# Patient Record
Sex: FEMALE | Race: WHITE | NOT HISPANIC OR LATINO | Employment: FULL TIME | URBAN - METROPOLITAN AREA
[De-identification: names, ages, dates, MRNs, and addresses within clinical notes are randomized per-mention and may not be internally consistent; named-entity substitution may affect disease eponyms.]

---

## 2024-05-13 ENCOUNTER — OFFICE VISIT (OUTPATIENT)
Dept: OBSTETRICS AND GYNECOLOGY | Facility: CLINIC | Age: 29
End: 2024-05-13
Payer: COMMERCIAL

## 2024-05-13 VITALS
HEART RATE: 81 BPM | BODY MASS INDEX: 36.48 KG/M2 | DIASTOLIC BLOOD PRESSURE: 80 MMHG | SYSTOLIC BLOOD PRESSURE: 123 MMHG | WEIGHT: 186.81 LBS

## 2024-05-13 DIAGNOSIS — Z31.89 ENCOUNTER FOR FERTILITY PLANNING: ICD-10-CM

## 2024-05-13 DIAGNOSIS — Z11.3 ENCOUNTER FOR SCREENING FOR INFECTIONS WITH PREDOMINANTLY SEXUAL MODE OF TRANSMISSION: ICD-10-CM

## 2024-05-13 DIAGNOSIS — Z01.419 WELL WOMAN EXAM WITH ROUTINE GYNECOLOGICAL EXAM: Primary | ICD-10-CM

## 2024-05-13 DIAGNOSIS — Z01.89 ENCOUNTER FOR LABORATORY TEST: ICD-10-CM

## 2024-05-13 DIAGNOSIS — N94.6 DYSMENORRHEA: ICD-10-CM

## 2024-05-13 PROCEDURE — 3074F SYST BP LT 130 MM HG: CPT | Mod: CPTII,S$GLB,,

## 2024-05-13 PROCEDURE — 3079F DIAST BP 80-89 MM HG: CPT | Mod: CPTII,S$GLB,,

## 2024-05-13 PROCEDURE — 3008F BODY MASS INDEX DOCD: CPT | Mod: CPTII,S$GLB,,

## 2024-05-13 PROCEDURE — 1160F RVW MEDS BY RX/DR IN RCRD: CPT | Mod: CPTII,S$GLB,,

## 2024-05-13 PROCEDURE — 99459 PELVIC EXAMINATION: CPT | Mod: S$GLB,,,

## 2024-05-13 PROCEDURE — 1159F MED LIST DOCD IN RCRD: CPT | Mod: CPTII,S$GLB,,

## 2024-05-13 PROCEDURE — 99395 PREV VISIT EST AGE 18-39: CPT | Mod: S$GLB,,,

## 2024-05-13 NOTE — PROGRESS NOTES
Subjective:      Patient ID: Ronel Douglas is a 28 y.o. female who presents for evaluation today.    Chief Complaint:    Well Woman, Dysmenorrhea (Pt states that her periods are extremely painful. She feels like she is being stabbed in the stomach. She thinks she may have endometriosis. Her mom had it.), and Insulin Resistance (She would like her hormones checked and her insulin. She has been having symptoms lately that is making her think she may be insulin resistant.)      History of Present Illness  HPI  Annual Exam (Premenopausal) - Patient presents for annual exam. The patient also has complaints of painful periods. The patient is sexually active. GYN screening history: last pap: was normal. The patient wears seatbelts: yes. The patient participates in regular exercise: no. Has the patient ever been transfused or tattooed?: not asked. The patient reports that there is not domestic violence in her life. She reports regular periods, they are painful and crampy. They last approximately 5 days, she uses menstrual cup, changing every 2-3 hours on a heavy day. She takes mydol or aleve at home for discomfort. She denies non-menstrual pelvic pain, nor dyspareunia. She has been tracking her cycle and actively trying for 2 years with her . She reports soft, normal bowel movements. No problems with urination. She reports feeling bloated after eating, weight gain of approximately 25lb in 4 years. No particular exercise or dietary changes.    GYN History  Patient's last menstrual period was 05/03/2024 (exact date).   Date of Last Pap: N/A    VITALS  /80   Pulse 81   Wt 84.7 kg (186 lb 12.8 oz)   LMP 05/03/2024 (Exact Date)   BMI 36.48 kg/m²   Weight: 84.7 kg (186 lb 12.8 oz)         PAST MEDICAL HISTORY  Past Medical History:   Diagnosis Date    ADHD        PAST SURGICAL HISTORY  History reviewed. No pertinent surgical history.    SOCIAL HISTORY  Social History     Tobacco Use   Smoking Status Never    Smokeless Tobacco Current   ,   Social History     Substance and Sexual Activity   Alcohol Use Not Currently        MEDICATIONS  No outpatient medications have been marked as taking for the 5/13/24 encounter (Office Visit) with Micheline Gomez NP.         Review of Systems   Review of Systems   Constitutional:  Negative for activity change.   Eyes:  Negative for visual disturbance.   Respiratory:  Negative for shortness of breath.    Cardiovascular:  Negative for chest pain.   Gastrointestinal:  Negative for abdominal pain.   Genitourinary:  Positive for dysmenorrhea. Negative for vaginal bleeding.        No abnormal vaginal bleeding   Musculoskeletal:  Negative for back pain.   Integumentary:  Negative for rash and breast mass.   Neurological:  Negative for numbness.   Psychiatric/Behavioral:          No mood disturbance or changes    Breast: Negative for mass          Objective:     Physical Exam:   Constitutional: She is oriented to person, place, and time. She appears well-developed. She is cooperative.    HENT:   Head: Normocephalic.     Neck: Trachea normal. No thyromegaly present.    Cardiovascular:  Normal rate, regular rhythm and normal heart sounds.             Pulmonary/Chest: Effort normal and breath sounds normal. Right breast exhibits no mass, no nipple discharge and no skin change. Left breast exhibits no mass, no nipple discharge and no skin change.        Abdominal: Soft. There is no abdominal tenderness. There is no rebound and no guarding.     Genitourinary:    Vagina and uterus normal.      Pelvic exam was performed with patient supine.   The external female genitalia was normal.     Labial bartholins normal.There is no lesion on the right labia. There is no lesion on the left labia. Cervix is normal. Right adnexum displays no mass and no tenderness. Left adnexum displays no mass and no tenderness. Cervix exhibits no motion tenderness and no discharge. Uterus is not enlarged and not tender.               Lymphadenopathy:        Head (right side): No submental and no submandibular adenopathy present.        Head (left side): No submental and no submandibular adenopathy present.     She has no cervical adenopathy.    Neurological: She is alert and oriented to person, place, and time.    Skin: Skin is warm.    Psychiatric: She has a normal mood and affect. Her speech is normal and behavior is normal. Thought content normal.          Assessment:        1. Well woman exam with routine gynecological exam    2. Encounter for laboratory test    3. Encounter for fertility planning    4. Dysmenorrhea    5. Encounter for screening for infections with predominantly sexual mode of transmission       Well woman exam with routine gynecological exam    Encounter for laboratory test  -     Vitamin D; Future; Expected date: 05/13/2024  -     CBC Auto Differential; Future; Expected date: 05/13/2024  -     Comprehensive Metabolic Panel; Future; Expected date: 05/13/2024  -     T4, Free; Future; Expected date: 05/13/2024  -     TSH; Future; Expected date: 05/13/2024  -     Urinalysis, Reflex to Urine Culture Urine, Clean Catch; Future; Expected date: 05/13/2024  -     Lipid Panel; Future; Expected date: 05/13/2024  -     Hemoglobin A1C; Future; Expected date: 05/13/2024    Encounter for fertility planning  -     Prolactin; Future; Expected date: 05/13/2024  -     Insulin, Random; Future; Expected date: 05/13/2024  -     Testosterone; Future; Expected date: 05/13/2024  -     Testosterone, Free; Future; Expected date: 05/13/2024  -     Estradiol; Future; Expected date: 05/13/2024  -     Follicle Stimulating Hormone; Future; Expected date: 05/13/2024  -     DHEA-Sulfate; Future; Expected date: 05/13/2024  -     Luteinizing Hormone; Future; Expected date: 05/13/2024  -     Antimullerian hormone (AMH); Future; Expected date: 05/13/2024  -     HCG, Serum, Qualitative; Future; Expected date: 05/13/2024  -     Ambulatory  referral/consult to Infertility; Future; Expected date: 05/20/2024    Dysmenorrhea  -     US OB/GYN Procedure ([facility]); Future    Encounter for screening for infections with predominantly sexual mode of transmission  -     C. trachomatis/N. gonorrhoeae by AMP DNA Other; Cervicovaginal         Plan:     Patient instructed to contact the clinic should any questions or concerns arise prior to her next office visit. Patient is happy with the plan of care at this time, verbalizes understanding and denies outstanding questions.      Pap  Self-breast exams  Daily prenatal vitamin with folic acid  If you don't hear from the office regarding results within 1 week, please call  Follow up in 1 year for annual or sooner as needed  Chaperone present for exam

## 2024-05-14 LAB
CHLAMYDIA: NEGATIVE
GONORRHEA: NEGATIVE
SOURCE: NORMAL

## 2024-06-13 ENCOUNTER — PROCEDURE VISIT (OUTPATIENT)
Dept: OBSTETRICS AND GYNECOLOGY | Facility: CLINIC | Age: 29
End: 2024-06-13
Payer: COMMERCIAL

## 2024-06-13 DIAGNOSIS — R79.89 ELEVATED PROLACTIN LEVEL: ICD-10-CM

## 2024-06-13 DIAGNOSIS — N94.6 DYSMENORRHEA: ICD-10-CM

## 2024-06-13 DIAGNOSIS — E88.819 INSULIN RESISTANCE: Primary | ICD-10-CM

## 2024-06-13 DIAGNOSIS — Z31.89 ENCOUNTER FOR FERTILITY PLANNING: ICD-10-CM

## 2024-06-13 LAB
ABS NRBC COUNT: 0 X 10 3/UL (ref 0–0.01)
ABSOLUTE BASOPHIL: 0.02 X 10 3/UL (ref 0–0.22)
ABSOLUTE EOSINOPHIL: 0.05 X 10 3/UL (ref 0.04–0.54)
ABSOLUTE IMMATURE GRAN: 0.03 X 10 3/UL (ref 0–0.04)
ABSOLUTE LYMPHOCYTE: 1.97 X 10 3/UL (ref 0.86–4.75)
ABSOLUTE MONOCYTE: 0.39 X 10 3/UL (ref 0.22–1.08)
ADDITIONAL TESTING: NORMAL
ALBUMIN SERPL-MCNC: 4.5 G/DL (ref 3.5–5.2)
ALBUMIN/GLOB SERPL ELPH: 1.5 {RATIO} (ref 1–2.7)
ALP ISOS SERPL LEV INH-CCNC: 83 U/L (ref 35–105)
ALT (SGPT): 14 U/L (ref 0–33)
AMORPH URATE CRY URNS QL MICRO: NEGATIVE
ANION GAP SERPL CALC-SCNC: 13 MMOL/L (ref 8–17)
AST SERPL-CCNC: 14 U/L (ref 0–32)
BACTERIA #/AREA URNS HPF: ABNORMAL /[HPF]
BASOPHILS NFR BLD: 0.3 % (ref 0.2–1.2)
BILIRUB UR QL STRIP: NEGATIVE
BILIRUBIN, TOTAL: 0.38 MG/DL (ref 0–1.2)
BUN/CREAT SERPL: 13.2 (ref 6–20)
CALCIUM SERPL-MCNC: 9.8 MG/DL (ref 8.6–10.2)
CARBON DIOXIDE, CO2: 26 MMOL/L (ref 22–29)
CHLORIDE: 104 MMOL/L (ref 98–107)
CHOLEST SERPL-MSCNC: 170 MG/DL (ref 100–200)
CLARITY UR: ABNORMAL
COLOR UR: YELLOW
CREAT SERPL-MCNC: 0.74 MG/DL (ref 0.5–0.9)
DHEA SULFATE: 149 UG/DL (ref 98.8–340)
E2: 439 PG/ML
EOSINOPHIL NFR BLD: 0.7 % (ref 0.7–7)
EPITHELIAL CELLS: ABNORMAL
ESTIMATED AVERAGE GLUCOSE: 99 MG/DL
FREE TESTOSTERONE: 0.52 NG/DL (ref 0–1)
FSH: 15 MIU/ML
GFR ESTIMATION: 114.36 ML/MIN/1.73M2
GLOBULIN: 3.1 G/DL (ref 1.5–4.5)
GLUCOSE (UA): NEGATIVE MG/DL
GLUCOSE: 98 MG/DL (ref 74–106)
HBA1C MFR BLD: 5.1 % (ref 4–6)
HCG QUALITATIVE: NEGATIVE MIU/ML
HCT VFR BLD AUTO: 42.5 % (ref 37–47)
HDLC SERPL-MCNC: 72 MG/DL
HGB BLD-MCNC: 13.6 G/DL (ref 12–16)
IMMATURE GRANULOCYTES: 0.4 % (ref 0–0.5)
INSULIN AB SER QL: 20.4 UIU/ML (ref 2.6–24.9)
KETONES UR QL STRIP: ABNORMAL MG/DL
LDL/HDL RATIO: 1.2 (ref 1–3)
LDLC SERPL CALC-MCNC: 85 MG/DL (ref 0–100)
LEUKOCYTE ESTERASE UR QL STRIP: ABNORMAL
LH: 50.1 MIU/ML
LYMPHOCYTES NFR BLD: 27 % (ref 19.3–53.1)
MCH RBC QN AUTO: 27.6 PG (ref 27–32)
MCHC RBC AUTO-ENTMCNC: 32 G/DL (ref 32–36)
MCV RBC AUTO: 86.2 FL (ref 82–100)
MONOCYTES NFR BLD: 5.3 % (ref 4.7–12.5)
MUCOUS THREADS URNS QL MICRO: NEGATIVE
NEUTROPHILS # BLD AUTO: 4.84 X 10 3/UL (ref 2.15–7.56)
NEUTROPHILS NFR BLD: 66.3 % (ref 34–71.1)
NITRITE UR QL STRIP: NEGATIVE
NUCLEATED RED BLOOD CELLS: 0 /100 WBC (ref 0–0.2)
OCCULT BLOOD: NEGATIVE
PH, URINE: 5 (ref 5–7.5)
PLATELET # BLD AUTO: 317 X 10 3/UL (ref 135–400)
POTASSIUM: 4.1 MMOL/L (ref 3.5–5.1)
PROLACTIN SERPL-MCNC: 37.3 NG/ML (ref 4.79–23.3)
PROT SNV-MCNC: 7.6 G/DL (ref 6.4–8.3)
PROT UR QL STRIP: NEGATIVE MG/DL
RBC # BLD AUTO: 4.93 X 10 6/UL (ref 4.2–5.4)
RBC/HPF: NEGATIVE
RDW-SD: 39.6 FL (ref 37–54)
SODIUM: 143 MMOL/L (ref 136–145)
SP GR UR STRIP: 1.02 (ref 1–1.03)
T4, FREE: 1.21 NG/DL (ref 0.93–1.7)
TESTOST SERPL-MCNC: 28.8 NG/DL (ref 8.4–48.1)
TRIGL SERPL-MCNC: 65 MG/DL (ref 0–150)
TSH SERPL DL<=0.005 MIU/L-ACNC: 1.41 UIU/ML (ref 0.27–4.2)
UREA NITROGEN (BUN): 9.8 MG/DL (ref 6–20)
UROBILINOGEN, URINE: NORMAL E.U./DL (ref 0–1)
VITAMIN D (25OHD): 31.1 NG/ML
WBC # BLD: 7.3 X 10 3/UL (ref 4.3–10.8)
WBC/HPF: ABNORMAL

## 2024-06-13 PROCEDURE — 76856 US EXAM PELVIC COMPLETE: CPT | Mod: S$GLB,,, | Performed by: OBSTETRICS & GYNECOLOGY

## 2024-06-13 RX ORDER — METFORMIN HYDROCHLORIDE 750 MG/1
1500 TABLET, EXTENDED RELEASE ORAL
Qty: 60 TABLET | Refills: 11 | Status: SHIPPED | OUTPATIENT
Start: 2024-06-13 | End: 2025-06-13

## 2024-09-26 ENCOUNTER — TELEPHONE (OUTPATIENT)
Dept: OBSTETRICS AND GYNECOLOGY | Facility: CLINIC | Age: 29
End: 2024-09-26
Payer: COMMERCIAL

## 2024-09-26 NOTE — TELEPHONE ENCOUNTER
----- Message from Fiona Ledbetter sent at 9/26/2024  2:53 PM CDT -----  Regarding: soon appt  Contact: Ronel  .Type:  Sooner Apoointment Request    Caller is requesting a sooner appointment.  Caller declined first available appointment listed below.  Caller will not accept being placed on the waitlist and is requesting a message be sent to doctor.  Name of Caller: Ronel   When is the first available appointment?  Symptoms:  Would the patient rather a call back or a response via [facility]? call  Best Call Back Number: [phone] (home)    Additional Information: Ronel is requesting a callback from the nurse today in regard to being scheduled for her first pregnancy confirmation appointment please.

## 2024-09-26 NOTE — TELEPHONE ENCOUNTER
Spoke with patient, verified that she was on her husbands insurance and has [employer] and patient confirmed. [facility]  and she states that she only saw her because  moved. Patients lmp was 8/23/24. Scheduled patient for a pregnancy confirmation next week.

## 2024-10-01 ENCOUNTER — TELEPHONE (OUTPATIENT)
Dept: OBSTETRICS AND GYNECOLOGY | Facility: CLINIC | Age: 29
End: 2024-10-01

## 2024-10-01 DIAGNOSIS — O20.9 BLEEDING IN EARLY PREGNANCY: Primary | ICD-10-CM

## 2024-10-01 NOTE — TELEPHONE ENCOUNTER
----- Message from Cortney sent at 10/1/2024  8:03 AM CDT -----  Contact: Charlotte (Mother)  Patient is requesting a call back regarding bleeding while pregnant. Please call back at [phone]

## 2024-10-01 NOTE — TELEPHONE ENCOUNTER
Reviewed the patient's emergency room notes and spoke with the patient's she reports her bleeding is very minimal at this point in his only brown when she wipes.  She has no pain at all and has no cramping.  The patient we will repeat an hCG level on Thursday.  I have told her we will put a notes that we will check this level and not leave without seeing with this as going to be.  I have also given the precautions for which the patient is to call our office or to return to the emergency room if his after hours.  The patient voiced understanding.

## 2024-10-01 NOTE — TELEPHONE ENCOUNTER
Called pt mother she gave phone to patient she stated she had some cramping this morning and went to take a bath when she got out the bath tub she had a big gush of blood. I advised pt to report to [facility] er to be evaluated

## 2024-10-03 ENCOUNTER — TELEPHONE (OUTPATIENT)
Dept: OBSTETRICS AND GYNECOLOGY | Facility: CLINIC | Age: 29
End: 2024-10-03
Payer: COMMERCIAL

## 2024-10-03 DIAGNOSIS — O20.9 BLEEDING IN EARLY PREGNANCY: Primary | ICD-10-CM

## 2024-10-03 LAB — B-HCG SERPL-ACNC: 944 MIU/ML

## 2024-10-03 NOTE — TELEPHONE ENCOUNTER
Spoke with the patient regarding her dropping hCG level.  The patient reports she has spotting but not bleeding heavily.  Precautions were given regarding need for going to the emergency room also we discussed the different options of D&C versus medical management versus expectant management.  We will do an ultrasound in the morning the patient knows to be NPO after midnight in case we need to move towards a D&C.

## 2024-10-04 ENCOUNTER — OFFICE VISIT (OUTPATIENT)
Dept: OBSTETRICS AND GYNECOLOGY | Facility: CLINIC | Age: 29
End: 2024-10-04
Payer: COMMERCIAL

## 2024-10-04 ENCOUNTER — PROCEDURE VISIT (OUTPATIENT)
Dept: OBSTETRICS AND GYNECOLOGY | Facility: CLINIC | Age: 29
End: 2024-10-04
Payer: COMMERCIAL

## 2024-10-04 VITALS
DIASTOLIC BLOOD PRESSURE: 86 MMHG | SYSTOLIC BLOOD PRESSURE: 134 MMHG | BODY MASS INDEX: 37.15 KG/M2 | HEART RATE: 88 BPM | HEIGHT: 60 IN | WEIGHT: 189.19 LBS

## 2024-10-04 DIAGNOSIS — O20.9 BLEEDING IN EARLY PREGNANCY: ICD-10-CM

## 2024-10-04 DIAGNOSIS — O03.9 MISCARRIAGE: Primary | ICD-10-CM

## 2024-10-04 PROCEDURE — 76817 TRANSVAGINAL US OBSTETRIC: CPT | Mod: 26,S$PBB,, | Performed by: OBSTETRICS & GYNECOLOGY

## 2024-10-04 NOTE — PROGRESS NOTES
Subjective     Patient ID: Ronel Douglas is a 28 y.o. female.    Chief Complaint:  Threatened Miscarriage      History of Present Illness  HPI  Patient presents today with complaints of vaginal bleeding was here to follow up from the emergency room.  All those notes were reviewed as well as her labs.  The patient had a positive pregnancy test she was then seen in the emergency room for vaginal bleeding.  Those notes revealed that she had an ultrasound showing a small sac in the uterus with 2 2 cm cyst in the left adnexa.  The patient had an hCG of 1300s and was noted to have O-positive blood type.  I spoke with her that day and she reported her pain had resolved and she just had some menstrual Like bleeding.  At that time precautions were given and she was instructed to follow up with a repeat hCG which dropped down to 940.  She reports she has just had bleeding but no pain at this time.    GYN & OB History  Patient's last menstrual period was 2024 (exact date).   Date of Last Pap: 5/10/2023    OB History    Para Term  AB Living   1 0 0 0 1 0   SAB IAB Ectopic Multiple Live Births   1 0 0 0 0      # Outcome Date GA Lbr Aureliano/2nd Weight Sex Type Anes PTL Lv   1 SAB                Review of Systems  Review of Systems   Gastrointestinal:  Negative for abdominal pain, bloating, blood in stool, constipation, diarrhea, nausea, vomiting, reflux and fecal incontinence.   Genitourinary:  Positive for vaginal bleeding. Negative for bladder incontinence, decreased libido, dysmenorrhea, dyspareunia, dysuria, flank pain, frequency, genital sores, hematuria, hot flashes, menorrhagia, menstrual problem, pelvic pain, urgency, vaginal discharge, vaginal pain, urinary incontinence, postcoital bleeding, postmenopausal bleeding, vaginal dryness and vaginal odor.          Objective   Physical Exam:   Constitutional: Vital signs are normal. She appears well-developed and well-nourished.               Genitourinary:     Vagina and uterus normal.      Pelvic exam was performed with patient supine.     Labial bartholins normal.Cervix is normal. Right adnexum displays no mass, no tenderness and no fullness. Left adnexum displays no mass, no tenderness and no fullness. No erythema, vaginal discharge, tenderness, bleeding, rectocele, cystocele or prolapse of vaginal walls in the vagina.    No foreign body in the vagina.      No signs of injury in the vagina.   Cervix exhibits no motion tenderness, no discharge and no friability.                     Indication   ========   Indication: Missed      Pregnancy   =========   Quintana pregnancy. Number of fetuses: 1     Assessment   ==========   Gestational sac: not visualized     Maternal Structures   ===============   Uterus / Cervix   Uterus length 95 mm   Uterus width 67 mm   Uterus height 49 mm   Uterus Vol 163.0 cmï¿½   Endometrial thickness, total 11.0 mm   Ovaries / Tubes / Adnexa   Rt ovary D1 29 mm   Rt ovary D2 25 mm   Rt ovary D3 14 mm   Rt ovary Vol 5.3 cmï¿½   Lt ovary D1 38 mm   Lt ovary D2 47 mm   Lt ovary D3 31 mm   Lt ovary Vol 28.6 cmï¿½     Impression   =========   No Gs vis. 2.3 cm complex area adjacent to vs on lt ovary   2.3 cystic area vis lt ovary,   2 cm area (corpus luteum)   Cystic area vis rt ovary 1.5 cm      Assessment and Plan     1. Miscarriage            Plan:  Miscarriage  -     HCG, Quantitative; Future; Expected date: 10/04/2024      The patient now has nothing in her uterus she also has no pain at this time.  And her HCg has been dropping.  We had an extensive discussion about expectant management at this time.  I went over with her and her spouse the ultrasound findings.  We discussed the findings in the adnexa and discuss that she has 0 pain and that we could do expectant management and repeat hCG and discuss that if she had any pain she will need to come in.  We also discussed that we could do something like methotrexate.  The patient  declined that.  We will repeat an hCG on Monday.  The patient understands that if she has to have any pain she needs to come in and she voiced understanding.

## 2024-10-08 ENCOUNTER — PROCEDURE VISIT (OUTPATIENT)
Dept: OBSTETRICS AND GYNECOLOGY | Facility: CLINIC | Age: 29
End: 2024-10-08
Payer: COMMERCIAL

## 2024-10-08 ENCOUNTER — OFFICE VISIT (OUTPATIENT)
Dept: OBSTETRICS AND GYNECOLOGY | Facility: CLINIC | Age: 29
End: 2024-10-08
Payer: COMMERCIAL

## 2024-10-08 ENCOUNTER — TELEPHONE (OUTPATIENT)
Dept: OBSTETRICS AND GYNECOLOGY | Facility: CLINIC | Age: 29
End: 2024-10-08

## 2024-10-08 VITALS
DIASTOLIC BLOOD PRESSURE: 82 MMHG | WEIGHT: 190.19 LBS | HEART RATE: 70 BPM | BODY MASS INDEX: 37.34 KG/M2 | SYSTOLIC BLOOD PRESSURE: 121 MMHG | HEIGHT: 60 IN

## 2024-10-08 DIAGNOSIS — O20.0 THREATENED MISCARRIAGE: ICD-10-CM

## 2024-10-08 DIAGNOSIS — O00.102 LEFT TUBAL PREGNANCY, UNSPECIFIED WHETHER INTRAUTERINE PREGNANCY PRESENT: Primary | ICD-10-CM

## 2024-10-08 DIAGNOSIS — O03.9 MISCARRIAGE: Primary | ICD-10-CM

## 2024-10-08 PROCEDURE — 3079F DIAST BP 80-89 MM HG: CPT | Mod: CPTII,,, | Performed by: OBSTETRICS & GYNECOLOGY

## 2024-10-08 PROCEDURE — 3044F HG A1C LEVEL LT 7.0%: CPT | Mod: CPTII,,, | Performed by: OBSTETRICS & GYNECOLOGY

## 2024-10-08 PROCEDURE — 1159F MED LIST DOCD IN RCRD: CPT | Mod: CPTII,,, | Performed by: OBSTETRICS & GYNECOLOGY

## 2024-10-08 PROCEDURE — 76817 TRANSVAGINAL US OBSTETRIC: CPT | Mod: 26,S$PBB,, | Performed by: OBSTETRICS & GYNECOLOGY

## 2024-10-08 PROCEDURE — 3008F BODY MASS INDEX DOCD: CPT | Mod: CPTII,,, | Performed by: OBSTETRICS & GYNECOLOGY

## 2024-10-08 PROCEDURE — 99213 OFFICE O/P EST LOW 20 MIN: CPT | Mod: S$PBB,,, | Performed by: OBSTETRICS & GYNECOLOGY

## 2024-10-08 PROCEDURE — 3074F SYST BP LT 130 MM HG: CPT | Mod: CPTII,,, | Performed by: OBSTETRICS & GYNECOLOGY

## 2024-10-08 NOTE — PROGRESS NOTES
Subjective     Patient ID: Ronel Douglas is a 29 y.o. female.    Chief Complaint:  Miscarriage (Hcg level not going down)      History of Present Illness  HPI  Patient presents today with complaints of follow up on beta HCG level.  The patient has an ultrasound done showing similar findings from last week.  She was seen in the emergency room for bleeding with a positive pregnancy test had an hCG level in the 1300s and a sac like structure with some cysts in the left adnexa.  Follow up HCG was in the 900s she was seen with me with some cysts in the adnexa and a structure also in the adnexa but had no pain and had continued to have some light bleeding.  After discussion it was decided for the patient to follow up with a repeat hCG wishes only in the 800s at this time  The patient reports she still continues to have some bleeding and denies any significant pain  History reviewed. No pertinent surgical history.   Past Medical History:  No date: [facility]   GYN & OB History  Patient's last menstrual period was 2024 (exact date).   Date of Last Pap: 5/10/2023    OB History    Para Term  AB Living   1 0 0 0 1 0   SAB IAB Ectopic Multiple Live Births   1 0 0 0 0      # Outcome Date GA Lbr Aureliano/2nd Weight Sex Type Anes PTL Lv   1 2024               Review of Systems  Review of Systems   Gastrointestinal:  Negative for abdominal pain, bloating, blood in stool, constipation, diarrhea, nausea, vomiting, reflux and fecal incontinence.   Genitourinary:  Positive for vaginal bleeding. Negative for bladder incontinence, decreased libido, dysmenorrhea, dyspareunia, dysuria, flank pain, frequency, genital sores, hematuria, hot flashes, menorrhagia, menstrual problem, pelvic pain, urgency, vaginal discharge, vaginal pain, urinary incontinence, postcoital bleeding, postmenopausal bleeding, vaginal dryness and vaginal odor.          Objective   Physical Exam:   Constitutional: Vital signs are normal. She appears  well-developed and well-nourished.               Genitourinary:    Vagina and uterus normal.      Pelvic exam was performed with patient supine.     Labial bartholins normal.Cervix is normal. Right adnexum displays no mass, no tenderness and no fullness. Left adnexum displays no mass, no tenderness and no fullness. No erythema, vaginal discharge, tenderness, bleeding, rectocele, cystocele or prolapse of vaginal walls in the vagina.    No foreign body in the vagina.      No signs of injury in the vagina.   Cervix exhibits no motion tenderness, no discharge and no friability.                     Indication   ========   Indication: Missed      Pregnancy   =========   Quintana pregnancy. Number of fetuses: 1     Assessment   ==========   Gestational sac: not visualized     Maternal Structures   ===============   Uterus / Cervix   Uterus length 97 mm   Uterus width 61 mm   Uterus height 48 mm   Uterus Vol 147.7 cmï¿½   Endometrial thickness, total 11.6 mm   Ovaries / Tubes / Adnexa   Rt ovary D1 31 mm   Rt ovary D2 17 mm   Rt ovary D3 20 mm   Rt ovary Vol 5.4 cmï¿½   Lt ovary D1 44 mm   Lt ovary D2 38 mm   Lt ovary D3 19 mm   Lt ovary Vol 16.7 cmï¿½     Impression   =========   No GS vis. Cystic area lt ovary vis. 2.2 cms   Complex area lt ovary 1.4 cms   Complex area lt adnexa. Adjacent to vs on lt ovary 2.0 cms                                                       Sonographer: Dawn Maurer   Electronically Signed by: Delores Celaya at 10/08/2024-13:24      Assessment and Plan     1. Left tubal pregnancy, unspecified whether intrauterine pregnancy present            Plan:  Left tubal pregnancy, unspecified whether intrauterine pregnancy present  -     HCG, Quantitative; Future; Expected date: 10/08/2024  -     HCG, Quantitative; Future; Expected date: 10/09/2024      We had a discussion regarding the findings on her ultrasound and the patient's hCG is dropping but not dropping significantly so at this time we have  decided to move forward with methotrexate treatment the patient understands she will also need follow up beta-hCGs on days 4 and 7 she also was given instructions for which to go to the emergency room with any severe pain severe bleeding patient voiced understanding

## 2024-10-08 NOTE — TELEPHONE ENCOUNTER
Spoke with the patient she only has light bleeding -had one Episode of slight cramping but no other pain.  This point I just discussed with her that her hCG is not dropping very much at this time.  I would like to have her come for another ultrasound to assess what is going on.

## 2024-10-11 ENCOUNTER — PATIENT MESSAGE (OUTPATIENT)
Dept: OBSTETRICS AND GYNECOLOGY | Facility: CLINIC | Age: 29
End: 2024-10-11
Payer: COMMERCIAL

## 2024-10-11 LAB — B-HCG SERPL-ACNC: 1053 MIU/ML

## 2024-10-14 ENCOUNTER — TELEPHONE (OUTPATIENT)
Dept: OBSTETRICS AND GYNECOLOGY | Facility: CLINIC | Age: 29
End: 2024-10-14
Payer: COMMERCIAL

## 2024-10-14 DIAGNOSIS — O00.102 LEFT TUBAL PREGNANCY, UNSPECIFIED WHETHER INTRAUTERINE PREGNANCY PRESENT: Primary | ICD-10-CM

## 2024-10-14 NOTE — TELEPHONE ENCOUNTER
"Spoke with Dr. Celaya. "HCG level dropped 15% and needs to repeat in a week"    Called and spoke with patient in regards to her results and instructed to repeat lab on Monday next week. Pt is aware and verbalized understanding.   Sanjana Lentz  "

## 2024-10-14 NOTE — TELEPHONE ENCOUNTER
Calling patient to make sure she knows to go to the lab for another hcg. Left vm letting her know and to get with us for any questions. Order is in the chart.

## 2024-10-21 ENCOUNTER — PATIENT MESSAGE (OUTPATIENT)
Dept: OBSTETRICS AND GYNECOLOGY | Facility: CLINIC | Age: 29
End: 2024-10-21
Payer: COMMERCIAL

## 2024-10-21 DIAGNOSIS — O00.102 LEFT TUBAL PREGNANCY, UNSPECIFIED WHETHER INTRAUTERINE PREGNANCY PRESENT: Primary | ICD-10-CM

## 2024-10-21 DIAGNOSIS — O03.9 MISCARRIAGE: ICD-10-CM

## 2024-10-25 ENCOUNTER — PATIENT MESSAGE (OUTPATIENT)
Dept: OBSTETRICS AND GYNECOLOGY | Facility: CLINIC | Age: 29
End: 2024-10-25

## 2024-10-25 ENCOUNTER — PROCEDURE VISIT (OUTPATIENT)
Dept: OBSTETRICS AND GYNECOLOGY | Facility: CLINIC | Age: 29
End: 2024-10-25
Payer: COMMERCIAL

## 2024-10-25 ENCOUNTER — OFFICE VISIT (OUTPATIENT)
Dept: OBSTETRICS AND GYNECOLOGY | Facility: CLINIC | Age: 29
End: 2024-10-25
Payer: COMMERCIAL

## 2024-10-25 VITALS
SYSTOLIC BLOOD PRESSURE: 129 MMHG | BODY MASS INDEX: 36.79 KG/M2 | HEART RATE: 83 BPM | DIASTOLIC BLOOD PRESSURE: 89 MMHG | WEIGHT: 187.38 LBS | HEIGHT: 60 IN

## 2024-10-25 DIAGNOSIS — O00.102 LEFT TUBAL PREGNANCY, UNSPECIFIED WHETHER INTRAUTERINE PREGNANCY PRESENT: Primary | ICD-10-CM

## 2024-10-25 DIAGNOSIS — O03.9 MISCARRIAGE: ICD-10-CM

## 2024-10-25 DIAGNOSIS — O00.102 LEFT TUBAL PREGNANCY, UNSPECIFIED WHETHER INTRAUTERINE PREGNANCY PRESENT: ICD-10-CM

## 2024-10-25 LAB — B-HCG SERPL-ACNC: 206 MIU/ML

## 2024-10-25 PROCEDURE — 76801 OB US < 14 WKS SINGLE FETUS: CPT | Mod: 26,S$PBB,, | Performed by: OBSTETRICS & GYNECOLOGY

## 2024-10-25 RX ORDER — CIPROFLOXACIN 500 MG/1
500 TABLET ORAL 2 TIMES DAILY
Qty: 14 TABLET | Refills: 0 | Status: SHIPPED | OUTPATIENT
Start: 2024-10-25 | End: 2024-11-01

## 2024-10-25 NOTE — PROGRESS NOTES
Subjective     Patient ID: Ronel Douglas is a 29 y.o. female.    Chief Complaint:  Threatened Miscarriage and Ectopic Pregnancy      History of Present Illness  HPI  Patient presents today with complaints of some bleeding with an odor.  The patient is status post methotrexate for pregnancy of unknown origin with hCG not decreasing appropriately.  The patient reports she has had bleeding since her methotrexate she did have some heavier bleeding with clots but bleeding has decreased however the bleeding is old blood with an odor.  She denies any fever chills or pelvic pain.  Ultrasound results and lab results were reviewed with the patient.    GYN & OB History  Patient's last menstrual period was 2024 (exact date).   Date of Last Pap: 5/10/2023    OB History    Para Term  AB Living   1 0 0 0 1 0   SAB IAB Ectopic Multiple Live Births   1 0 0 0 0      # Outcome Date GA Lbr Aureliano/2nd Weight Sex Type Anes PTL Lv   2024               Review of Systems  Review of Systems   Gastrointestinal:  Negative for abdominal pain, bloating, blood in stool, constipation, diarrhea, nausea, vomiting, reflux and fecal incontinence.   Genitourinary:  Positive for vaginal bleeding. Negative for bladder incontinence, decreased libido, dysmenorrhea, dyspareunia, dysuria, flank pain, frequency, genital sores, hematuria, hot flashes, menorrhagia, menstrual problem, pelvic pain, urgency, vaginal discharge, vaginal pain, urinary incontinence, postcoital bleeding, postmenopausal bleeding, vaginal dryness and vaginal odor.          Objective   Physical Exam:   Constitutional: Vital signs are normal. She appears well-developed and well-nourished.               Genitourinary:    Vagina and uterus normal.      Pelvic exam was performed with patient supine.     Labial bartholins normal.Cervix is normal. Right adnexum displays no mass, no tenderness and no fullness. Left adnexum displays no mass, no tenderness and no fullness.  No erythema, vaginal discharge, tenderness, bleeding, rectocele, cystocele or prolapse of vaginal walls in the vagina.    No foreign body in the vagina.      No signs of injury in the vagina.   Cervix exhibits no motion tenderness, no discharge and no friability.                  Indication   ========   Indication: Missed      Pregnancy   =========   Quintana pregnancy. Number of fetuses: 1     Maternal Structures   ===============   Uterus / Cervix   Endometrial thickness, total 3.7 mm   Uterine fibroid D1 38 mm   Uterine fibroid D2 30 mm   Uterine fibroid mean 34.0 mm   Uterine fibroid vol 17.611 cmï¿½   Ovaries / Tubes / Adnexa   Rt ovary D1 37 mm   Rt ovary D2 18 mm   Rt ovary D3 16 mm   Rt ovary Vol 5.5 cmï¿½   Lt ovary D1 32 mm   Lt ovary D2 35 mm   Lt ovary D3 32 mm   Lt ovary Vol 18.5 cmï¿½     Impression   =========   fibroid vis in ut fundus   mass vis adjacent to left ovary 3.4 x 2.1cm   simple cyst vis left ovary 2.0 x 1.5cm                                                       Sonographer: Clara Solis   Electronically Signed by: Delores Celaya at 10/25/2024-09:59         Assessment and Plan     1. Left tubal pregnancy, unspecified whether intrauterine pregnancy present    2. Miscarriage            Plan:  Left tubal pregnancy, unspecified whether intrauterine pregnancy present    Miscarriage  -     HCG, Quantitative; Future; Expected date: 10/25/2024  -     ciprofloxacin HCl (CIPRO) 500 MG tablet; Take 1 tablet (500 mg total) by mouth 2 (two) times daily. for 7 days  Dispense: 14 tablet; Refill: 0      The patient was hCG has been appropriately dropping the after her methotrexate the patient also has an endometrial stripe that is extremely thin today and on exam she has no tenderness or signs of any endometritis.  We discussed precautions for which the patient should go to the emergency room we will repeat an hCG today to make sure as appropriately continuing to fall.  We will also address the 3 cm  lesion that is in the adnexa and make sure it is continuing to go away I do not feel however that this represents a pregnancy at this time this could also be a dermoid of some sort as it was near the adnexa on the ovary.  We did discuss if this does not go away once her hCG is negative she may need a diagnostic laparoscopy to remove this.  She also may need some additional imaging.  The patient and her spouse voiced understanding.

## 2024-10-30 ENCOUNTER — PATIENT MESSAGE (OUTPATIENT)
Dept: OBSTETRICS AND GYNECOLOGY | Facility: CLINIC | Age: 29
End: 2024-10-30
Payer: COMMERCIAL

## 2024-10-30 DIAGNOSIS — O00.102 LEFT TUBAL PREGNANCY, UNSPECIFIED WHETHER INTRAUTERINE PREGNANCY PRESENT: Primary | ICD-10-CM

## 2024-11-06 ENCOUNTER — PATIENT MESSAGE (OUTPATIENT)
Dept: OBSTETRICS AND GYNECOLOGY | Facility: CLINIC | Age: 29
End: 2024-11-06
Payer: COMMERCIAL

## 2024-11-06 DIAGNOSIS — N83.209 CYST OF OVARY, UNSPECIFIED LATERALITY: Primary | ICD-10-CM

## 2024-11-20 ENCOUNTER — PROCEDURE VISIT (OUTPATIENT)
Dept: OBSTETRICS AND GYNECOLOGY | Facility: CLINIC | Age: 29
End: 2024-11-20
Payer: COMMERCIAL

## 2024-11-20 ENCOUNTER — TELEPHONE (OUTPATIENT)
Dept: OBSTETRICS AND GYNECOLOGY | Facility: CLINIC | Age: 29
End: 2024-11-20

## 2024-11-20 ENCOUNTER — OFFICE VISIT (OUTPATIENT)
Dept: OBSTETRICS AND GYNECOLOGY | Facility: CLINIC | Age: 29
End: 2024-11-20
Payer: COMMERCIAL

## 2024-11-20 VITALS
BODY MASS INDEX: 37.53 KG/M2 | HEART RATE: 80 BPM | DIASTOLIC BLOOD PRESSURE: 83 MMHG | HEIGHT: 60 IN | SYSTOLIC BLOOD PRESSURE: 133 MMHG | WEIGHT: 191.19 LBS

## 2024-11-20 DIAGNOSIS — N83.209 CYST OF OVARY, UNSPECIFIED LATERALITY: ICD-10-CM

## 2024-11-20 DIAGNOSIS — N93.9 ABNORMAL UTERINE BLEEDING: ICD-10-CM

## 2024-11-20 DIAGNOSIS — N83.209 CYST OF OVARY, UNSPECIFIED LATERALITY: Primary | ICD-10-CM

## 2024-11-20 DIAGNOSIS — G89.18 POSTOPERATIVE PAIN: ICD-10-CM

## 2024-11-20 PROCEDURE — 76830 TRANSVAGINAL US NON-OB: CPT | Mod: 26,S$PBB,, | Performed by: OBSTETRICS & GYNECOLOGY

## 2024-11-20 RX ORDER — IBUPROFEN 800 MG/1
800 TABLET ORAL 3 TIMES DAILY PRN
Qty: 30 TABLET | Refills: 1 | Status: SHIPPED | OUTPATIENT
Start: 2024-11-20

## 2024-11-20 RX ORDER — OXYCODONE AND ACETAMINOPHEN 5; 325 MG/1; MG/1
1 TABLET ORAL EVERY 4 HOURS PRN
Qty: 20 TABLET | Refills: 0 | Status: SHIPPED | OUTPATIENT
Start: 2024-11-20

## 2024-11-20 NOTE — TELEPHONE ENCOUNTER
Spoke with patient. She has had a previous mab and now is having an odor when bleeding. Got patient scheduled to come in this afternoon

## 2024-11-20 NOTE — TELEPHONE ENCOUNTER
----- Message from Fiona sent at 11/20/2024  3:01 PM CST -----  Regarding: Medical Advice  Contact: Ronel  .Type:  Needs Medical Advice    Who Called: Ronel  Symptoms (please be specific):    How long has patient had these symptoms:    Pharmacy name and phone #:    Would the patient rather a call back or a response via [facility]? Call   Best Call Back Number:  [phone]  Additional Information:  Ronel is requesting a callback from the nurse today. No reason provided.

## 2024-11-20 NOTE — PROGRESS NOTES
History & Physical    SUBJECTIVE:     History of Present Illness:  Patient is a 29 y.o. female presents with abnormal bleeding since her miscarriage.  She has had off and on bleeding for the last 6 weeks she reports at times it is heavy with clots and sometimes it is like.  She reports the bleeding has a different odor.  She denies any pain    HPI   No chief complaint on file.      Review of patient's allergies indicates:  No Known Allergies    Current Outpatient Medications   Medication Sig Dispense Refill    ibuprofen (ADVIL,MOTRIN) 800 MG tablet Take 1 tablet (800 mg total) by mouth 3 (three) times daily as needed for Pain. 30 tablet 1    metFORMIN (GLUCOPHAGE-XR) 750 MG ER 24hr tablet Take 2 tablets (1,500 mg total) by mouth daily with dinner or evening meal. (Patient not taking: Reported on 10/4/2024) 60 tablet 11    oxyCODONE-acetaminophen (PERCOCET) 5-325 mg per tablet Take 1 tablet by mouth every 4 (four) hours as needed for Pain. 20 tablet 0     No current facility-administered medications for this visit.       Past Medical History:   Diagnosis Date    ADHD      History reviewed. No pertinent surgical history.  Family History   Problem Relation Name Age of Onset    Breast cancer Paternal Grandmother Tanja 65    Endometriosis Mother      Diabetes Maternal Uncle Sharad     Colon cancer Neg Hx      Ovarian cancer Neg Hx      Uterine cancer Neg Hx      Cervical cancer Neg Hx      Prostate cancer Neg Hx      Pancreatic cancer Neg Hx      Melanoma Neg Hx       Social History     Tobacco Use    Smoking status: Never    Smokeless tobacco: Never   Substance Use Topics    Alcohol use: Not Currently    Drug use: Never        Review of Systems:  Review of Systems   Gastrointestinal:  Negative for abdominal pain, bloating, blood in stool, constipation, diarrhea, nausea, vomiting, reflux and fecal incontinence.   Genitourinary:  Positive for vaginal bleeding. Negative for bladder incontinence, decreased libido,  dysmenorrhea, dyspareunia, dysuria, flank pain, frequency, genital sores, hematuria, hot flashes, menorrhagia, menstrual problem, pelvic pain, urgency, vaginal discharge, vaginal pain, urinary incontinence, postcoital bleeding, postmenopausal bleeding, vaginal dryness and vaginal odor.        OBJECTIVE:     Vital Signs (Most Recent)  Pulse: 80 (11/20/24 1536)  BP: 133/83 (11/20/24 1536)  5' (1.524 m)  86.7 kg (191 lb 3.2 oz)     Physical Exam:  Physical Exam:   Constitutional: Vital signs are normal. She appears well-developed and well-nourished.               Genitourinary:    Vagina and uterus normal.      Pelvic exam was performed with patient supine.     Labial bartholins normal.Cervix is normal. Right adnexum displays no mass, no tenderness and no fullness. Left adnexum displays no mass, no tenderness and no fullness. No erythema, vaginal discharge, tenderness, bleeding, rectocele, cystocele or prolapse of vaginal walls in the vagina.    No foreign body in the vagina.      No signs of injury in the vagina.   Cervix exhibits no motion tenderness, no discharge and no friability.                      Laboratory      Diagnostic Results:  Indication   ========   Indication: Ovarian Cyst, Unspecified Side     Uterus   ======   Uterus: Visualized   Uterus length 88 mm   Uterus width 62 mm   Uterus height 44 mm   Uterus Vol 124.4 cmï¿½   Endometrial thickness, total 6.1 mm     Right Ovary   =========   Rt ovary: Visualized   Rt ovary D1 35 mm   Rt ovary D2 22 mm   Rt ovary D3 15 mm   Rt ovary Vol 5.9 cmï¿½     Left Ovary   ========   Lt ovary: Visualized   Lt ovary D1 56 mm   Lt ovary D2 39 mm   Lt ovary D3 38 mm   Lt ovary Vol 42.8 cmï¿½   Lt ovarian cyst(s): Cysts identified     Impression   =========   multiple cystic areas seen in ut fundus   left ovarian cyst 4.9 x 3.4cm   rt ovary wnl                                                       Sonographer: Clara Solis   Electronically Signed by: Delores Celaya at  11/20/2024-16:37     ASSESSMENT/PLAN:     1. Cyst of ovary, unspecified laterality  - US OB/GYN Procedure ([facility]); Future  - Ambulatory Referral to External Surgery    2. Abnormal uterine bleeding  - US OB/GYN Procedure ([facility]); Future  - Ambulatory Referral to External Surgery    3. Postoperative pain  - ibuprofen (ADVIL,MOTRIN) 800 MG tablet; Take 1 tablet (800 mg total) by mouth 3 (three) times daily as needed for Pain.  Dispense: 30 tablet; Refill: 1  - oxyCODONE-acetaminophen (PERCOCET) 5-325 mg per tablet; Take 1 tablet by mouth every 4 (four) hours as needed for Pain.  Dispense: 20 tablet; Refill: 0       PLAN:Plan   At this time discussed with the patient she has continued to have this abnormal bleeding according to the ultrasound findings her cyst has gotten larger there is also some cystic areas in the uterus so we will do a ovarian cyst removal hysteroscopy D&C under ultrasound guidance  Discussed with patient the risks of surgery, including but not limited to, risks of anesthesia, infection, bleeding, injury to other organs, such as skin, nerves, arteries, veins, bowel, bladder, ureters, with possible need for reparative or subsequent surgery  . Discussed with the Patient the risks/benefits/alternatives of the treatment options.  Consents were signed

## 2024-11-21 LAB
APPEARANCE, UA: CLEAR
BILIRUB UR QL STRIP: NEGATIVE MG/DL
COLOR UR: ABNORMAL
GLUCOSE (UA): NORMAL MG/DL
HGB UR QL STRIP: 150 /UL
KETONES UR QL STRIP: NEGATIVE MG/DL
LEUKOCYTE ESTERASE UR QL STRIP: NEGATIVE /UL
NITRITE UR QL STRIP: NEGATIVE
PH UR STRIP: 7 PH (ref 5–8)
PROT UR QL STRIP: NEGATIVE MG/DL
SP GR UR STRIP: 1.02 (ref 1–1.03)
SPECIMEN COLLECTION METHOD, URINE: ABNORMAL
UROBILINOGEN UR STRIP-ACNC: NORMAL MG/DL

## 2024-11-25 LAB
B-HCG SERPL-ACNC: < 1 MIU/ML
BASOPHILS NFR BLD: 0.6 % (ref 0–3)
EOSINOPHIL NFR BLD: 2.2 % (ref 1–3)
ERYTHROCYTE [DISTWIDTH] IN BLOOD BY AUTOMATED COUNT: 13.1 % (ref 12.5–18)
HCT VFR BLD AUTO: 37.5 % (ref 37–47)
HGB BLD-MCNC: 12.2 G/DL (ref 12–16)
LYMPHOCYTES NFR BLD: 31 % (ref 25–40)
MCH RBC QN AUTO: 26.6 PG (ref 27–31.2)
MCHC RBC AUTO-ENTMCNC: 32.5 G/DL (ref 31.8–35.4)
MCV RBC AUTO: 81.9 FL (ref 80–97)
MONOCYTES NFR BLD: 5.6 % (ref 1–15)
NEUTROPHILS # BLD AUTO: 4.92 10*3/UL (ref 1.8–7.7)
NEUTROPHILS NFR BLD: 60.2 % (ref 37–80)
NUCLEATED RED BLOOD CELLS: 0 %
PLATELETS: 364 10*3/UL (ref 142–424)
RBC # BLD AUTO: 4.58 10*6/UL (ref 4.2–5.4)
WBC # BLD: 8.2 10*3/UL (ref 4.6–10.2)

## 2024-11-26 ENCOUNTER — OUTSIDE PLACE OF SERVICE (OUTPATIENT)
Dept: OBSTETRICS AND GYNECOLOGY | Facility: CLINIC | Age: 29
End: 2024-11-26

## 2024-11-26 DIAGNOSIS — N83.209 CYST OF OVARY, UNSPECIFIED LATERALITY: Primary | ICD-10-CM

## 2024-11-27 ENCOUNTER — TELEPHONE (OUTPATIENT)
Dept: OBSTETRICS AND GYNECOLOGY | Facility: CLINIC | Age: 29
End: 2024-11-27
Payer: COMMERCIAL

## 2024-11-27 NOTE — TELEPHONE ENCOUNTER
Called to check on patient. She states she is doing good just a little sore. Scheduled her post op appointment and advised to call if she needed anything before then

## 2024-12-03 ENCOUNTER — PATIENT MESSAGE (OUTPATIENT)
Dept: OBSTETRICS AND GYNECOLOGY | Facility: CLINIC | Age: 29
End: 2024-12-03
Payer: COMMERCIAL

## 2024-12-03 ENCOUNTER — TELEPHONE (OUTPATIENT)
Dept: OBSTETRICS AND GYNECOLOGY | Facility: CLINIC | Age: 29
End: 2024-12-03
Payer: COMMERCIAL

## 2024-12-03 NOTE — TELEPHONE ENCOUNTER
Spoke with patient. Cancelled ultrasound due to patient having surgery and removing the cyst. She states she is feeling fine just a little old blood when she wipes. She will come in for her post op on the 18th.

## 2024-12-03 NOTE — TELEPHONE ENCOUNTER
----- Message from Karol sent at 12/3/2024  2:42 PM CST -----  Contact: self  Type:  Patient Returning Call    Who Called:Ronel Douglas  Who Left Message for Patient:unsure  Does the patient know what this is regarding?:US?  Would the patient rather a call back or a response via [facility]?   Best Call Back Number:[phone]  Additional Information: n/a

## 2024-12-18 ENCOUNTER — OFFICE VISIT (OUTPATIENT)
Dept: OBSTETRICS AND GYNECOLOGY | Facility: CLINIC | Age: 29
End: 2024-12-18
Payer: COMMERCIAL

## 2024-12-18 VITALS
HEIGHT: 60 IN | SYSTOLIC BLOOD PRESSURE: 115 MMHG | BODY MASS INDEX: 38.28 KG/M2 | DIASTOLIC BLOOD PRESSURE: 74 MMHG | WEIGHT: 195 LBS | HEART RATE: 76 BPM

## 2024-12-18 DIAGNOSIS — Z09 POSTOP CHECK: Primary | ICD-10-CM

## 2024-12-18 DIAGNOSIS — Z31.69 ENCOUNTER FOR PRECONCEPTION CONSULTATION: ICD-10-CM

## 2024-12-18 PROCEDURE — 3044F HG A1C LEVEL LT 7.0%: CPT | Mod: CPTII,,, | Performed by: OBSTETRICS & GYNECOLOGY

## 2024-12-18 PROCEDURE — 3074F SYST BP LT 130 MM HG: CPT | Mod: CPTII,,, | Performed by: OBSTETRICS & GYNECOLOGY

## 2024-12-18 PROCEDURE — 3078F DIAST BP <80 MM HG: CPT | Mod: CPTII,,, | Performed by: OBSTETRICS & GYNECOLOGY

## 2024-12-18 PROCEDURE — 1159F MED LIST DOCD IN RCRD: CPT | Mod: CPTII,,, | Performed by: OBSTETRICS & GYNECOLOGY

## 2024-12-18 PROCEDURE — 99024 POSTOP FOLLOW-UP VISIT: CPT | Mod: ,,, | Performed by: OBSTETRICS & GYNECOLOGY

## 2024-12-18 NOTE — PROGRESS NOTES
Subjective     Patient ID: Ronel Douglas is a 29 y.o. female.    Chief Complaint:  Post-op Evaluation      History of Present Illness  HPI  Patient presents today for 2 week follow up from paratubal cystectomy hysteroscopy D&C  The patient reports that she has not had any abnormal bleeding in her pain is adequately controlled she also wanted to discuss her fertility issues.  She reports they have been trying for the last 2 years and this past loss was the 1st time they had achieve pregnancy.  She does reports her cycles were coming normally.  She would not track her ovulation with using an ovulation predictor kit in the past  She denies any complaints      GYN & OB History  Patient's last menstrual period was 2024.   Date of Last Pap: 5/10/2023    OB History    Para Term  AB Living   1 0 0 0 1 0   SAB IAB Ectopic Multiple Live Births   1 0 0 0 0      # Outcome Date GA Lbr Aureliano/2nd Weight Sex Type Anes PTL Lv   1 2024               Review of Systems  Review of Systems   Constitutional:  Negative for activity change, fatigue and fever.   Gastrointestinal:  Negative for abdominal pain, constipation and nausea.   Genitourinary:  Negative for dysuria, frequency, pelvic pain, vaginal bleeding and vaginal pain.          Objective   Physical Exam:             Abdominal: Soft. Bowel sounds are normal.                  Skin:   INC clean dry and intact             Assessment and Plan     1. Postop check    2. Encounter for preconception consultation            Plan:  Postop check    Encounter for preconception consultation    We discussed the importance of a folic acid supplementation to prevent neural tube defects.  We also discussed 20% pregnancy rate per cycle.  We discussed the importance of ovulation tracking and explained the use of ovulation predictor kits.  We discussed fertility and discussed tubal patency ovulation and as well as the importance of sperm count.  The patient will call if she has  any issues at the six-month regina.  She will also begin ova boost   At this time we will also run a semen analysis on her spouse and she will check her ovulation if she does not have a positive she will call me so that we can do the appropriate labs and see her sooner if not I will see her in 3 months